# Patient Record
Sex: MALE | Race: WHITE | NOT HISPANIC OR LATINO | Employment: OTHER | URBAN - NONMETROPOLITAN AREA
[De-identification: names, ages, dates, MRNs, and addresses within clinical notes are randomized per-mention and may not be internally consistent; named-entity substitution may affect disease eponyms.]

---

## 2017-01-01 ENCOUNTER — PREP FOR SURGERY (OUTPATIENT)
Dept: OTHER | Facility: HOSPITAL | Age: 52
End: 2017-01-01

## 2017-01-01 ENCOUNTER — ANESTHESIA EVENT (OUTPATIENT)
Dept: PERIOP | Facility: HOSPITAL | Age: 52
End: 2017-01-01

## 2017-01-01 ENCOUNTER — HOSPITAL ENCOUNTER (OUTPATIENT)
Dept: GENERAL RADIOLOGY | Facility: HOSPITAL | Age: 52
Discharge: HOME | End: 2017-07-28
Attending: INTERNAL MEDICINE | Admitting: INTERNAL MEDICINE

## 2017-01-01 ENCOUNTER — OFFICE VISIT (OUTPATIENT)
Dept: INTERNAL MEDICINE | Facility: CLINIC | Age: 52
End: 2017-01-01

## 2017-01-01 ENCOUNTER — TELEPHONE (OUTPATIENT)
Dept: INTERNAL MEDICINE | Facility: CLINIC | Age: 52
End: 2017-01-01

## 2017-01-01 ENCOUNTER — OFFICE VISIT (OUTPATIENT)
Dept: ORTHOPEDIC SURGERY | Facility: CLINIC | Age: 52
End: 2017-01-01

## 2017-01-01 ENCOUNTER — OFFICE VISIT (OUTPATIENT)
Dept: GASTROENTEROLOGY | Facility: CLINIC | Age: 52
End: 2017-01-01

## 2017-01-01 ENCOUNTER — TELEPHONE (OUTPATIENT)
Dept: ORTHOPEDIC SURGERY | Facility: CLINIC | Age: 52
End: 2017-01-01

## 2017-01-01 ENCOUNTER — APPOINTMENT (OUTPATIENT)
Dept: CT IMAGING | Facility: HOSPITAL | Age: 52
End: 2017-01-01

## 2017-01-01 ENCOUNTER — ANESTHESIA (OUTPATIENT)
Dept: PERIOP | Facility: HOSPITAL | Age: 52
End: 2017-01-01

## 2017-01-01 ENCOUNTER — HOSPITAL ENCOUNTER (OUTPATIENT)
Facility: HOSPITAL | Age: 52
Setting detail: HOSPITAL OUTPATIENT SURGERY
Discharge: HOME | End: 2017-04-17
Attending: PODIATRIST | Admitting: PODIATRIST

## 2017-01-01 ENCOUNTER — HOSPITAL ENCOUNTER (EMERGENCY)
Facility: HOSPITAL | Age: 52
Discharge: HOME | End: 2017-08-08
Attending: EMERGENCY MEDICINE | Admitting: EMERGENCY MEDICINE

## 2017-01-01 VITALS — HEIGHT: 66 IN | RESPIRATION RATE: 16 BRPM | BODY MASS INDEX: 30.53 KG/M2 | WEIGHT: 190 LBS

## 2017-01-01 VITALS
HEART RATE: 67 BPM | OXYGEN SATURATION: 97 % | TEMPERATURE: 98.2 F | RESPIRATION RATE: 18 BRPM | DIASTOLIC BLOOD PRESSURE: 78 MMHG | SYSTOLIC BLOOD PRESSURE: 129 MMHG

## 2017-01-01 VITALS — RESPIRATION RATE: 18 BRPM | HEIGHT: 66 IN | BODY MASS INDEX: 29.89 KG/M2 | WEIGHT: 186 LBS

## 2017-01-01 VITALS
TEMPERATURE: 97.6 F | SYSTOLIC BLOOD PRESSURE: 130 MMHG | OXYGEN SATURATION: 98 % | DIASTOLIC BLOOD PRESSURE: 82 MMHG | BODY MASS INDEX: 29.63 KG/M2 | RESPIRATION RATE: 16 BRPM | WEIGHT: 184.38 LBS | HEIGHT: 66 IN | HEART RATE: 78 BPM

## 2017-01-01 VITALS
SYSTOLIC BLOOD PRESSURE: 128 MMHG | HEART RATE: 57 BPM | WEIGHT: 165 LBS | TEMPERATURE: 98.4 F | OXYGEN SATURATION: 98 % | BODY MASS INDEX: 26.52 KG/M2 | RESPIRATION RATE: 20 BRPM | DIASTOLIC BLOOD PRESSURE: 94 MMHG | HEIGHT: 66 IN

## 2017-01-01 VITALS
HEART RATE: 100 BPM | TEMPERATURE: 98.3 F | HEIGHT: 66 IN | SYSTOLIC BLOOD PRESSURE: 125 MMHG | RESPIRATION RATE: 15 BRPM | BODY MASS INDEX: 28.93 KG/M2 | DIASTOLIC BLOOD PRESSURE: 93 MMHG | WEIGHT: 180 LBS

## 2017-01-01 VITALS
TEMPERATURE: 97.8 F | SYSTOLIC BLOOD PRESSURE: 144 MMHG | OXYGEN SATURATION: 98 % | HEART RATE: 66 BPM | DIASTOLIC BLOOD PRESSURE: 86 MMHG

## 2017-01-01 VITALS — RESPIRATION RATE: 16 BRPM | HEIGHT: 66 IN | WEIGHT: 181.1 LBS | BODY MASS INDEX: 29.11 KG/M2

## 2017-01-01 DIAGNOSIS — R35.1 FREQUENT URINATION AT NIGHT: ICD-10-CM

## 2017-01-01 DIAGNOSIS — M79.89 SOFT TISSUE MASS: ICD-10-CM

## 2017-01-01 DIAGNOSIS — M51.36 DEGENERATIVE DISC DISEASE, LUMBAR: ICD-10-CM

## 2017-01-01 DIAGNOSIS — M54.50 CHRONIC LOW BACK PAIN WITHOUT SCIATICA, UNSPECIFIED BACK PAIN LATERALITY: ICD-10-CM

## 2017-01-01 DIAGNOSIS — M79.89 SOFT TISSUE MASS: Primary | ICD-10-CM

## 2017-01-01 DIAGNOSIS — Z83.438 FAMILY HISTORY OF HYPERLIPIDEMIA: ICD-10-CM

## 2017-01-01 DIAGNOSIS — M54.50 ACUTE EXACERBATION OF CHRONIC LOW BACK PAIN: Primary | ICD-10-CM

## 2017-01-01 DIAGNOSIS — Z12.11 COLON CANCER SCREENING: Primary | ICD-10-CM

## 2017-01-01 DIAGNOSIS — M25.551 ACUTE RIGHT HIP PAIN: Primary | ICD-10-CM

## 2017-01-01 DIAGNOSIS — Z00.00 ANNUAL PHYSICAL EXAM: Primary | ICD-10-CM

## 2017-01-01 DIAGNOSIS — Z12.11 SCREEN FOR COLON CANCER: ICD-10-CM

## 2017-01-01 DIAGNOSIS — M25.551 ACUTE RIGHT HIP PAIN: ICD-10-CM

## 2017-01-01 DIAGNOSIS — M75.121 COMPLETE TEAR OF RIGHT ROTATOR CUFF: ICD-10-CM

## 2017-01-01 DIAGNOSIS — M25.511 ACUTE PAIN OF RIGHT SHOULDER: Primary | ICD-10-CM

## 2017-01-01 DIAGNOSIS — IMO0002 BURSITIS/TENDONITIS, SHOULDER: Primary | ICD-10-CM

## 2017-01-01 DIAGNOSIS — M54.50 LUMBAR SPINE PAIN: Primary | ICD-10-CM

## 2017-01-01 DIAGNOSIS — G89.29 CHRONIC LOW BACK PAIN WITHOUT SCIATICA, UNSPECIFIED BACK PAIN LATERALITY: ICD-10-CM

## 2017-01-01 DIAGNOSIS — G89.29 ACUTE EXACERBATION OF CHRONIC LOW BACK PAIN: Primary | ICD-10-CM

## 2017-01-01 LAB
AMPHET+METHAMPHET UR QL: NEGATIVE
AMPHETAMINES UR QL: NEGATIVE
BARBITURATES UR QL SCN: NEGATIVE
BENZODIAZ UR QL SCN: NEGATIVE
BILIRUB UR QL STRIP: NEGATIVE
BUPRENORPHINE SERPL-MCNC: NEGATIVE NG/ML
CANNABINOIDS SERPL QL: NEGATIVE
CHOLEST SERPL-MCNC: 169 MG/DL (ref 0–199)
CLARITY UR: CLEAR
COCAINE UR QL: NEGATIVE
COLOR UR: YELLOW
GLUCOSE UR STRIP-MCNC: NEGATIVE MG/DL
HBA1C MFR BLD: 5.3 %
HDLC SERPL-MCNC: 68 MG/DL (ref 40–60)
HGB UR QL STRIP.AUTO: NEGATIVE
KETONES UR QL STRIP: NEGATIVE
LAB AP CASE REPORT: NORMAL
LDLC SERPL CALC-MCNC: 86 MG/DL (ref 0–99)
LEUKOCYTE ESTERASE UR QL STRIP.AUTO: NEGATIVE
Lab: NORMAL
METHADONE UR QL SCN: NEGATIVE
NITRITE UR QL STRIP: NEGATIVE
OPIATES UR QL: POSITIVE
OXYCODONE UR QL SCN: NEGATIVE
PATH REPORT.FINAL DX SPEC: NORMAL
PCP UR QL SCN: NEGATIVE
PH UR STRIP.AUTO: <=5 [PH] (ref 5–8)
PROPOXYPH UR QL: NEGATIVE
PROT UR QL STRIP: NEGATIVE
PSA SERPL-MCNC: 0.93 NG/ML (ref 0.06–4)
SP GR UR STRIP: 1.02 (ref 1–1.03)
TRICYCLICS UR QL SCN: NEGATIVE
TRIGL SERPL-MCNC: 74 MG/DL
UROBILINOGEN UR QL STRIP: NORMAL
VLDLC SERPL CALC-MCNC: 14.8 MG/DL

## 2017-01-01 PROCEDURE — 96372 THER/PROPH/DIAG INJ SC/IM: CPT

## 2017-01-01 PROCEDURE — 81003 URINALYSIS AUTO W/O SCOPE: CPT | Performed by: PHYSICIAN ASSISTANT

## 2017-01-01 PROCEDURE — 73502 X-RAY EXAM HIP UNI 2-3 VIEWS: CPT

## 2017-01-01 PROCEDURE — 25010000002 PROPOFOL 200 MG/20ML EMULSION: Performed by: NURSE ANESTHETIST, CERTIFIED REGISTERED

## 2017-01-01 PROCEDURE — 25010000002 ORPHENADRINE CITRATE PER 60 MG: Performed by: PHYSICIAN ASSISTANT

## 2017-01-01 PROCEDURE — 99203 OFFICE O/P NEW LOW 30 MIN: CPT | Performed by: ORTHOPAEDIC SURGERY

## 2017-01-01 PROCEDURE — 99214 OFFICE O/P EST MOD 30 MIN: CPT | Performed by: INTERNAL MEDICINE

## 2017-01-01 PROCEDURE — 99024 POSTOP FOLLOW-UP VISIT: CPT | Performed by: PODIATRIST

## 2017-01-01 PROCEDURE — 72100 X-RAY EXAM L-S SPINE 2/3 VWS: CPT

## 2017-01-01 PROCEDURE — 25010000002 KETOROLAC TROMETHAMINE PER 15 MG: Performed by: PHYSICIAN ASSISTANT

## 2017-01-01 PROCEDURE — 83036 HEMOGLOBIN GLYCOSYLATED A1C: CPT | Performed by: NURSE PRACTITIONER

## 2017-01-01 PROCEDURE — S0260 H&P FOR SURGERY: HCPCS | Performed by: NURSE PRACTITIONER

## 2017-01-01 PROCEDURE — 99283 EMERGENCY DEPT VISIT LOW MDM: CPT

## 2017-01-01 PROCEDURE — 99396 PREV VISIT EST AGE 40-64: CPT | Performed by: NURSE PRACTITIONER

## 2017-01-01 PROCEDURE — 27618 EXC LEG/ANKLE TUM < 3 CM: CPT | Performed by: PODIATRIST

## 2017-01-01 PROCEDURE — 80306 DRUG TEST PRSMV INSTRMNT: CPT | Performed by: PHYSICIAN ASSISTANT

## 2017-01-01 PROCEDURE — 72131 CT LUMBAR SPINE W/O DYE: CPT

## 2017-01-01 PROCEDURE — 25010000002 DEXAMETHASONE PER 1 MG: Performed by: NURSE ANESTHETIST, CERTIFIED REGISTERED

## 2017-01-01 PROCEDURE — 25010000003 CEFAZOLIN PER 500 MG: Performed by: PODIATRIST

## 2017-01-01 PROCEDURE — 25010000002 ONDANSETRON PER 1 MG: Performed by: NURSE ANESTHETIST, CERTIFIED REGISTERED

## 2017-01-01 RX ORDER — BACITRACIN 50000 [IU]/1
INJECTION, POWDER, FOR SOLUTION INTRAMUSCULAR AS NEEDED
Status: DISCONTINUED | OUTPATIENT
Start: 2017-01-01 | End: 2017-01-01 | Stop reason: HOSPADM

## 2017-01-01 RX ORDER — PREDNISONE 20 MG/1
20 TABLET ORAL DAILY
Qty: 18 TABLET | Refills: 0 | Status: SHIPPED | OUTPATIENT
Start: 2017-01-01 | End: 2017-01-01 | Stop reason: SDUPTHER

## 2017-01-01 RX ORDER — BACITRACIN 50000 [IU]/1
INJECTION, POWDER, FOR SOLUTION INTRAMUSCULAR
Status: DISCONTINUED
Start: 2017-01-01 | End: 2017-01-01 | Stop reason: HOSPADM

## 2017-01-01 RX ORDER — PROPOFOL 10 MG/ML
INJECTION, EMULSION INTRAVENOUS AS NEEDED
Status: DISCONTINUED | OUTPATIENT
Start: 2017-01-01 | End: 2017-01-01 | Stop reason: SURG

## 2017-01-01 RX ORDER — OXYCODONE AND ACETAMINOPHEN 7.5; 325 MG/1; MG/1
1 TABLET ORAL EVERY 6 HOURS PRN
Qty: 30 TABLET | Refills: 0 | Status: SHIPPED | OUTPATIENT
Start: 2017-01-01 | End: 2017-01-01

## 2017-01-01 RX ORDER — AMITRIPTYLINE HYDROCHLORIDE 25 MG/1
TABLET, FILM COATED ORAL
Qty: 60 TABLET | Refills: 1 | Status: SHIPPED | OUTPATIENT
Start: 2017-01-01 | End: 2017-01-01 | Stop reason: ALTCHOICE

## 2017-01-01 RX ORDER — SODIUM CHLORIDE 0.9 % (FLUSH) 0.9 %
1-10 SYRINGE (ML) INJECTION AS NEEDED
Status: DISCONTINUED | OUTPATIENT
Start: 2017-01-01 | End: 2017-01-01 | Stop reason: HOSPADM

## 2017-01-01 RX ORDER — LIDOCAINE HYDROCHLORIDE 10 MG/ML
INJECTION, SOLUTION INFILTRATION; PERINEURAL AS NEEDED
Status: DISCONTINUED | OUTPATIENT
Start: 2017-01-01 | End: 2017-01-01 | Stop reason: HOSPADM

## 2017-01-01 RX ORDER — CELECOXIB 200 MG/1
200 CAPSULE ORAL 2 TIMES DAILY
Qty: 60 CAPSULE | Refills: 5 | Status: SHIPPED | OUTPATIENT
Start: 2017-01-01

## 2017-01-01 RX ORDER — PREDNISONE 20 MG/1
TABLET ORAL
Qty: 18 TABLET | Refills: 0 | Status: SHIPPED | OUTPATIENT
Start: 2017-01-01 | End: 2017-01-01

## 2017-01-01 RX ORDER — ONDANSETRON 2 MG/ML
INJECTION INTRAMUSCULAR; INTRAVENOUS AS NEEDED
Status: DISCONTINUED | OUTPATIENT
Start: 2017-01-01 | End: 2017-01-01 | Stop reason: SURG

## 2017-01-01 RX ORDER — LIDOCAINE HYDROCHLORIDE 10 MG/ML
INJECTION, SOLUTION INFILTRATION; PERINEURAL
Status: DISCONTINUED
Start: 2017-01-01 | End: 2017-01-01 | Stop reason: HOSPADM

## 2017-01-01 RX ORDER — HYDROCODONE BITARTRATE AND ACETAMINOPHEN 7.5; 325 MG/1; MG/1
TABLET ORAL
Refills: 0 | COMMUNITY
Start: 2017-01-01 | End: 2017-01-01

## 2017-01-01 RX ORDER — OXYCODONE AND ACETAMINOPHEN 10; 325 MG/1; MG/1
TABLET ORAL
Refills: 0 | COMMUNITY
Start: 2017-01-01 | End: 2017-01-01

## 2017-01-01 RX ORDER — CELECOXIB 200 MG/1
CAPSULE ORAL
Qty: 60 CAPSULE | Refills: 1 | Status: SHIPPED | OUTPATIENT
Start: 2017-01-01 | End: 2017-01-01 | Stop reason: SDUPTHER

## 2017-01-01 RX ORDER — SODIUM CHLORIDE, SODIUM LACTATE, POTASSIUM CHLORIDE, CALCIUM CHLORIDE 600; 310; 30; 20 MG/100ML; MG/100ML; MG/100ML; MG/100ML
100 INJECTION, SOLUTION INTRAVENOUS CONTINUOUS
Status: DISCONTINUED | OUTPATIENT
Start: 2017-01-01 | End: 2017-01-01 | Stop reason: HOSPADM

## 2017-01-01 RX ORDER — CYCLOBENZAPRINE HCL 5 MG
5 TABLET ORAL NIGHTLY PRN
Qty: 7 TABLET | Refills: 0 | Status: SHIPPED | OUTPATIENT
Start: 2017-01-01 | End: 2017-01-01

## 2017-01-01 RX ORDER — DEXAMETHASONE SODIUM PHOSPHATE 4 MG/ML
INJECTION, SOLUTION INTRA-ARTICULAR; INTRALESIONAL; INTRAMUSCULAR; INTRAVENOUS; SOFT TISSUE AS NEEDED
Status: DISCONTINUED | OUTPATIENT
Start: 2017-01-01 | End: 2017-01-01 | Stop reason: SURG

## 2017-01-01 RX ORDER — ORPHENADRINE CITRATE 30 MG/ML
30 INJECTION INTRAMUSCULAR; INTRAVENOUS ONCE
Status: COMPLETED | OUTPATIENT
Start: 2017-01-01 | End: 2017-01-01

## 2017-01-01 RX ORDER — CELECOXIB 200 MG/1
200 CAPSULE ORAL 2 TIMES DAILY
Qty: 60 CAPSULE | Refills: 5 | Status: SHIPPED | OUTPATIENT
Start: 2017-01-01 | End: 2017-01-01 | Stop reason: SDUPTHER

## 2017-01-01 RX ORDER — OXYCODONE AND ACETAMINOPHEN 10; 325 MG/1; MG/1
1 TABLET ORAL EVERY 6 HOURS PRN
COMMUNITY
End: 2017-01-01

## 2017-01-01 RX ORDER — SODIUM CHLORIDE 9 MG/ML
70 INJECTION, SOLUTION INTRAVENOUS CONTINUOUS PRN
Status: CANCELLED | OUTPATIENT
Start: 2017-01-01

## 2017-01-01 RX ORDER — BUPIVACAINE HYDROCHLORIDE 5 MG/ML
INJECTION, SOLUTION EPIDURAL; INTRACAUDAL AS NEEDED
Status: DISCONTINUED | OUTPATIENT
Start: 2017-01-01 | End: 2017-01-01 | Stop reason: HOSPADM

## 2017-01-01 RX ORDER — BUPIVACAINE HYDROCHLORIDE 5 MG/ML
INJECTION, SOLUTION EPIDURAL; INTRACAUDAL
Status: DISCONTINUED
Start: 2017-01-01 | End: 2017-01-01 | Stop reason: HOSPADM

## 2017-01-01 RX ORDER — KETOROLAC TROMETHAMINE 30 MG/ML
30 INJECTION, SOLUTION INTRAMUSCULAR; INTRAVENOUS ONCE
Status: COMPLETED | OUTPATIENT
Start: 2017-01-01 | End: 2017-01-01

## 2017-01-01 RX ORDER — GABAPENTIN 300 MG/1
CAPSULE ORAL
COMMUNITY
Start: 2017-01-01 | End: 2017-01-01

## 2017-01-01 RX ADMIN — KETOROLAC TROMETHAMINE 30 MG: 30 INJECTION, SOLUTION INTRAMUSCULAR at 11:11

## 2017-01-01 RX ADMIN — ORPHENADRINE CITRATE 30 MG: 30 INJECTION INTRAMUSCULAR; INTRAVENOUS at 11:12

## 2017-01-01 RX ADMIN — CEFAZOLIN SODIUM 2 G: 2 SOLUTION INTRAVENOUS at 07:55

## 2017-01-01 RX ADMIN — Medication 20 MG: at 08:01

## 2017-01-01 RX ADMIN — DEXAMETHASONE SODIUM PHOSPHATE 8 MG: 4 INJECTION, SOLUTION INTRAMUSCULAR; INTRAVENOUS at 07:56

## 2017-01-01 RX ADMIN — Medication 30 MG: at 07:54

## 2017-01-01 RX ADMIN — PROPOFOL 200 MG: 10 INJECTION, EMULSION INTRAVENOUS at 07:54

## 2017-01-01 RX ADMIN — LIDOCAINE HYDROCHLORIDE 40 MG: 20 INJECTION, SOLUTION INTRAVENOUS at 07:54

## 2017-01-01 RX ADMIN — ONDANSETRON 4 MG: 2 INJECTION INTRAMUSCULAR; INTRAVENOUS at 07:56

## 2017-01-01 RX ADMIN — SODIUM CHLORIDE, POTASSIUM CHLORIDE, SODIUM LACTATE AND CALCIUM CHLORIDE: 600; 310; 30; 20 INJECTION, SOLUTION INTRAVENOUS at 07:54

## 2017-01-01 RX ADMIN — SODIUM CHLORIDE, POTASSIUM CHLORIDE, SODIUM LACTATE AND CALCIUM CHLORIDE 100 ML/HR: 600; 310; 30; 20 INJECTION, SOLUTION INTRAVENOUS at 07:42

## 2017-02-13 RX ORDER — CELECOXIB 200 MG/1
CAPSULE ORAL
Qty: 60 CAPSULE | Refills: 1 | Status: SHIPPED | OUTPATIENT
Start: 2017-02-13 | End: 2017-01-01 | Stop reason: SDUPTHER

## 2017-02-23 ENCOUNTER — TELEPHONE (OUTPATIENT)
Dept: INTERNAL MEDICINE | Facility: CLINIC | Age: 52
End: 2017-02-23

## 2017-02-23 NOTE — TELEPHONE ENCOUNTER
----- Message from Maryam Copeland sent at 2/23/2017  3:33 PM EST -----  PATIENT CALLED AND WANTED DR VERMEESCH TO KNOW THAT THE INJECTIONS HE HAS BEEN GETTING ARE NOT WORKING. HE STATES THAT THE MEDICATION CELEBREX IS NOT HAVING AN AFFECT ON HIM AS WELL HE STATES THAT HE IS HAVING SHOOTING PAIN IN HIS LOWER BACK. PLEASE CALL AND LET HIM KNOW IF THERE IS SOMETHING ELSE DR VERMEESCH WANTS HIM TO DO.     [phone]

## 2017-02-24 ENCOUNTER — OFFICE VISIT (OUTPATIENT)
Dept: INTERNAL MEDICINE | Facility: CLINIC | Age: 52
End: 2017-02-24

## 2017-02-24 VITALS
HEART RATE: 61 BPM | DIASTOLIC BLOOD PRESSURE: 68 MMHG | OXYGEN SATURATION: 98 % | SYSTOLIC BLOOD PRESSURE: 132 MMHG | TEMPERATURE: 97.9 F

## 2017-02-24 DIAGNOSIS — J00 ACUTE NASOPHARYNGITIS: Primary | ICD-10-CM

## 2017-02-24 DIAGNOSIS — M67.80 CYST OF TENDON SHEATH: ICD-10-CM

## 2017-02-24 PROCEDURE — 99214 OFFICE O/P EST MOD 30 MIN: CPT | Performed by: INTERNAL MEDICINE

## 2017-03-24 DIAGNOSIS — M25.572 LEFT ANKLE PAIN, UNSPECIFIED CHRONICITY: Primary | ICD-10-CM

## 2017-03-28 ENCOUNTER — OFFICE VISIT (OUTPATIENT)
Dept: ORTHOPEDIC SURGERY | Facility: CLINIC | Age: 52
End: 2017-03-28

## 2017-03-28 VITALS — RESPIRATION RATE: 18 BRPM | WEIGHT: 191 LBS | HEIGHT: 66 IN | BODY MASS INDEX: 30.7 KG/M2

## 2017-03-28 DIAGNOSIS — M79.89 SOFT TISSUE MASS: Primary | ICD-10-CM

## 2017-03-28 DIAGNOSIS — M25.571 RIGHT ANKLE PAIN, UNSPECIFIED CHRONICITY: Primary | ICD-10-CM

## 2017-03-28 PROCEDURE — 99204 OFFICE O/P NEW MOD 45 MIN: CPT | Performed by: PODIATRIST

## 2017-03-28 RX ORDER — SODIUM CHLORIDE 0.9 % (FLUSH) 0.9 %
1-10 SYRINGE (ML) INJECTION AS NEEDED
Status: CANCELLED | OUTPATIENT
Start: 2017-03-28

## 2017-03-28 RX ORDER — SODIUM CHLORIDE, SODIUM LACTATE, POTASSIUM CHLORIDE, CALCIUM CHLORIDE 600; 310; 30; 20 MG/100ML; MG/100ML; MG/100ML; MG/100ML
100 INJECTION, SOLUTION INTRAVENOUS CONTINUOUS
Status: CANCELLED | OUTPATIENT
Start: 2017-03-28

## 2017-03-28 NOTE — PROGRESS NOTES
Subjective   Patient ID: Daryl Jerome is a 51 y.o. male   Pain, Consult, and Cyst of the Right Ankle (Patient is being seen today at the request of Dr. Vermeesch.)   he presents with the chief complaint of right ankle pain due to a mass.  He states he doesn't know what it is.  He says it started out small and has progressively gotten larger over time.  States irritates him with any type of high top shoe or boots.  Dates it back to have this removed if possible.    History of Present Illness    Review of Systems   Constitutional: Negative for diaphoresis, fever and unexpected weight change.   HENT: Negative for dental problem and sore throat.    Eyes: Negative for visual disturbance.   Respiratory: Negative for shortness of breath.    Cardiovascular: Negative for chest pain.   Gastrointestinal: Negative for abdominal pain, constipation, diarrhea, nausea and vomiting.   Genitourinary: Negative for difficulty urinating and frequency.   Neurological: Negative for headaches.   Hematological: Does not bruise/bleed easily.       Past Medical History:   Diagnosis Date   • History of MRI     MRI of back and was told it was arthritis in back        Past Surgical History:   Procedure Laterality Date   • APPENDECTOMY         Allergies   Allergen Reactions   • No Known Drug Allergy        @/ reviewed@    Objective   Physical Exam   Constitutional: He is oriented to person, place, and time. He appears well-developed and well-nourished.   HENT:   Head: Normocephalic.   Eyes: Pupils are equal, round, and reactive to light.   Neurological: He is alert and oriented to person, place, and time.   Skin: Skin is warm.   Psychiatric: He has a normal mood and affect. His behavior is normal.   Vitals reviewed.   heart regular rate and rhythm  Lungs clear to auscultation  Abdomen soft nontender  Ortho Exam  Ortho Exam  Right Lower extremity exam:  Vascular: Pulses are palpable, pedal hair is noted, no edema or erythema are noted the  right lower extremity  Neuro: Sensations intact, reflexes normal  Derm: Over the distal anterior portion of the right fibula there is a elevated raised and firm nodule.  This area appears to be some adhered to the underlying subcutaneous tissue but is mobile.  Also seems to slightly transilluminate light.  MSK: I'll joint range of motion is normal.  Manual muscle testing 5 out of 5.  No pain with range of motion of the lower extremity joints    Assessment/Plan  right ankle soft tissue mass  Independent Review of Radiographic Studies:      Laboratory and Other Studies:     Medical Decision Making:        Procedures  [] No procedures were performed in office today.    Daryl was seen today for pain, consult and cyst.    Diagnoses and all orders for this visit:    Soft tissue mass        Recommendations/Plan:  I discussed the possible etiologies of this lesion.  I explained that it most likely is a ganglion although this is an odd spot for it clinically that's what it appears to be.  We discussed excision as well as aspiration and injection.  I advised him that typically draining these they will still come back.  I explained that even with excision they can return to this less likely.  He like to have this removed.  We discussed all the risks versus benefits and potential complications of the procedure.  No guarantees or assurances are given or implied.  Consent was obtained.  All his questions are answered.    No Follow-up on file.  Patient agreeable to call or return sooner for any concerns.

## 2017-04-02 ENCOUNTER — RESULTS ENCOUNTER (OUTPATIENT)
Dept: ORTHOPEDIC SURGERY | Facility: CLINIC | Age: 52
End: 2017-04-02

## 2017-04-02 DIAGNOSIS — M79.89 SOFT TISSUE MASS: ICD-10-CM

## 2017-04-03 ENCOUNTER — TELEPHONE (OUTPATIENT)
Dept: INTERNAL MEDICINE | Facility: CLINIC | Age: 52
End: 2017-04-03

## 2017-04-03 RX ORDER — FLUTICASONE PROPIONATE 50 MCG
2 SPRAY, SUSPENSION (ML) NASAL DAILY
Qty: 1 EACH | Refills: 3 | Status: SHIPPED | OUTPATIENT
Start: 2017-04-03

## 2017-04-03 NOTE — TELEPHONE ENCOUNTER
This med is over-the-counter, however you may try to call this in to see if insurance pays for it, Flonase 2 sprays each near daily #1 with 3 refills.

## 2017-04-10 ENCOUNTER — APPOINTMENT (OUTPATIENT)
Dept: PREADMISSION TESTING | Facility: HOSPITAL | Age: 52
End: 2017-04-10

## 2017-04-11 ENCOUNTER — APPOINTMENT (OUTPATIENT)
Dept: PREADMISSION TESTING | Facility: HOSPITAL | Age: 52
End: 2017-04-11

## 2017-04-11 VITALS — WEIGHT: 190 LBS | BODY MASS INDEX: 30.53 KG/M2 | HEIGHT: 66 IN

## 2017-04-11 LAB
ALBUMIN SERPL-MCNC: 4.9 G/DL (ref 3.5–5)
ALBUMIN/GLOB SERPL: 1.4 G/DL (ref 1–2)
ALP SERPL-CCNC: 56 U/L (ref 38–126)
ALT SERPL W P-5'-P-CCNC: 31 U/L (ref 13–69)
ANION GAP SERPL CALCULATED.3IONS-SCNC: 12.3 MMOL/L
AST SERPL-CCNC: 24 U/L (ref 15–46)
BASOPHILS # BLD AUTO: 0.03 10*3/MM3 (ref 0–0.2)
BASOPHILS NFR BLD AUTO: 0.5 % (ref 0–2.5)
BILIRUB SERPL-MCNC: 1.1 MG/DL (ref 0.2–1.3)
BUN BLD-MCNC: 32 MG/DL (ref 7–20)
BUN/CREAT SERPL: 32 (ref 6.3–21.9)
CALCIUM SPEC-SCNC: 10.1 MG/DL (ref 8.4–10.2)
CHLORIDE SERPL-SCNC: 105 MMOL/L (ref 98–107)
CO2 SERPL-SCNC: 27 MMOL/L (ref 26–30)
CREAT BLD-MCNC: 1 MG/DL (ref 0.6–1.3)
DEPRECATED RDW RBC AUTO: 41.2 FL (ref 37–54)
EOSINOPHIL # BLD AUTO: 0.15 10*3/MM3 (ref 0–0.7)
EOSINOPHIL NFR BLD AUTO: 2.5 % (ref 0–7)
ERYTHROCYTE [DISTWIDTH] IN BLOOD BY AUTOMATED COUNT: 11.9 % (ref 11.5–14.5)
GFR SERPL CREATININE-BSD FRML MDRD: 79 ML/MIN/1.73
GLOBULIN UR ELPH-MCNC: 3.4 GM/DL
GLUCOSE BLD-MCNC: 99 MG/DL (ref 74–98)
HCT VFR BLD AUTO: 45.5 % (ref 42–52)
HGB BLD-MCNC: 15.5 G/DL (ref 14–18)
IMM GRANULOCYTES # BLD: 0.01 10*3/MM3 (ref 0–0.06)
IMM GRANULOCYTES NFR BLD: 0.2 % (ref 0–0.6)
LYMPHOCYTES # BLD AUTO: 1.37 10*3/MM3 (ref 0.6–3.4)
LYMPHOCYTES NFR BLD AUTO: 22.5 % (ref 10–50)
MCH RBC QN AUTO: 32.6 PG (ref 27–31)
MCHC RBC AUTO-ENTMCNC: 34.1 G/DL (ref 30–37)
MCV RBC AUTO: 95.8 FL (ref 80–94)
MONOCYTES # BLD AUTO: 0.69 10*3/MM3 (ref 0–0.9)
MONOCYTES NFR BLD AUTO: 11.3 % (ref 0–12)
NEUTROPHILS # BLD AUTO: 3.84 10*3/MM3 (ref 2–6.9)
NEUTROPHILS NFR BLD AUTO: 63 % (ref 37–80)
NRBC BLD MANUAL-RTO: 0 /100 WBC (ref 0–0)
PLATELET # BLD AUTO: 275 10*3/MM3 (ref 130–400)
PMV BLD AUTO: 9.6 FL (ref 6–12)
POTASSIUM BLD-SCNC: 4.3 MMOL/L (ref 3.5–5.1)
PROT SERPL-MCNC: 8.3 G/DL (ref 6.3–8.2)
RBC # BLD AUTO: 4.75 10*6/MM3 (ref 4.7–6.1)
SODIUM BLD-SCNC: 140 MMOL/L (ref 137–145)
WBC NRBC COR # BLD: 6.09 10*3/MM3 (ref 4.8–10.8)

## 2017-04-11 PROCEDURE — 36415 COLL VENOUS BLD VENIPUNCTURE: CPT | Performed by: PODIATRIST

## 2017-04-11 PROCEDURE — 80053 COMPREHEN METABOLIC PANEL: CPT | Performed by: PODIATRIST

## 2017-04-11 PROCEDURE — 85025 COMPLETE CBC W/AUTO DIFF WBC: CPT | Performed by: PODIATRIST

## 2017-04-11 RX ORDER — IBUPROFEN 200 MG
200 TABLET ORAL EVERY 6 HOURS PRN
COMMUNITY

## 2017-04-11 NOTE — DISCHARGE INSTRUCTIONS
Patient instructed on PAT PASS information.  Patient/family member verbalized understanding of instruction/education.Chlorhexidine wipes given to patient with verification sheet. Patient/Family member verbalized understanding to all teaching and instruction.

## 2017-04-12 NOTE — PAT
Patient given wipes and instructions on how to use . Patient stated wouldn't alcohol work better , I think that 's  What I'll use.

## 2017-04-17 NOTE — PLAN OF CARE
Problem: Perioperative Period (Adult)  Intervention: Promote Pulmonary Hygiene and Secretion Clearance    04/17/17 0840   Promote Aggressive Pulmonary Hygiene/Secretion Management   Cough And Deep Breathing done with encouragement       Intervention: Monitor/Manage Pain    04/17/17 0840   Safety Interventions   Medication Review/Management medications reviewed

## 2017-04-17 NOTE — PLAN OF CARE
Problem: Patient Care Overview (Adult)  Goal: Plan of Care Review  Outcome: Ongoing (interventions implemented as appropriate)    04/17/17 0910   Coping/Psychosocial Response Interventions   Plan Of Care Reviewed With patient   Patient Care Overview   Progress improving   Outcome Evaluation   Outcome Summary/Follow up Plan PACU discharge criteria met

## 2017-04-17 NOTE — ANESTHESIA PREPROCEDURE EVALUATION
Anesthesia Evaluation     Patient summary reviewed and Nursing notes reviewed   NPO Status: > 8 hours   Airway   Mallampati: II  TM distance: <3 FB  Neck ROM: full  possible difficult intubation  Dental      Pulmonary - normal exam   (+) recent URI,   Cardiovascular - normal exam        Neuro/Psych  GI/Hepatic/Renal/Endo      Musculoskeletal     Abdominal  - normal exam   Substance History      OB/GYN          Other   (+) arthritis                               Anesthesia Plan    ASA 2     general     intravenous induction   Anesthetic plan and risks discussed with patient.

## 2017-04-17 NOTE — BRIEF OP NOTE
EXCISION MASS LOWER EXTREMITY  Procedure Note    Darly ARNDT Justus  4/17/2017    Pre-op Diagnosis:   Soft tissue mass [M79.9]    Post-op Diagnosis:     Post-Op Diagnosis Codes:     * Soft tissue mass [M79.9]    Procedure/CPT® Codes:  right ankle soft tissue mass excision cpt code 53611    Procedure(s):  Right ankle soft tissue mass excision    Surgeon(s):  Nirav Aguayo DPM    Anesthesia: Monitor Anesthesia Care    Staff:   Circulator: Marion England RN  Scrub Person: Francisca Parker; Haleigh White    Estimated Blood Loss: <10ml  Urine Voided: * No values recorded between 4/17/2017 12:00 AM and 4/17/2017  7:26 AM *    Specimens:                Per dictation      Drains:    none       Findings: per dictation    Complications: none      Nirav Aguayo DPM     Date: 4/17/2017  Time: 7:26 AM

## 2017-04-17 NOTE — PLAN OF CARE
Problem: GI Endoscopy (Adult)  Goal: Signs and Symptoms of Listed Potential Problems Will be Absent or Manageable (GI Endoscopy)  Outcome: Ongoing (interventions implemented as appropriate)    04/17/17 0722   GI Endoscopy   Problems Assessed (GI Endoscopy) all   Problems Present (GI Endoscopy) none

## 2017-04-17 NOTE — DISCHARGE INSTRUCTIONS
Rest today  No pushing,pulling,tugging,heavy lifting, or strenuous activity   No major decision making,driving,or drinking alcoholic beverages for 24 hours due to the sedation you received  Always use good hand hygiene/washing technique  No driving on pain medication.    To assist you in voiding:  Drink plenty of fluids  Listen to running water while attempting to void.    If you are unable to urinate and you have an uncomfortable urge to void or it has been   6 hours since you were discharged, return to the Emergency Room

## 2017-04-17 NOTE — PLAN OF CARE
Problem: Perioperative Period (Adult)  Goal: Signs and Symptoms of Listed Potential Problems Will be Absent or Manageable (Perioperative Period)  Outcome: Ongoing (interventions implemented as appropriate)    04/17/17 0850   Perioperative Period   Problems Assessed (Perioperative Period) all   Problems Present (Perioperative Period) none

## 2017-04-17 NOTE — ANESTHESIA POSTPROCEDURE EVALUATION
Patient: Daryl Jerome    Procedure Summary     Date Anesthesia Start Anesthesia Stop Room / Location    04/17/17 0747 0827  [facility] /  [facility]       Procedure Diagnosis Surgeon Provider    Right ankle soft tissue mass excision (Right ) Soft tissue mass  (Soft tissue mass [M79.9]) BRODY Gilbert CRNA          Anesthesia Type: general  Last vitals  /89 (04/17/17 0730)    Temp 97.8 °F (36.6 °C) (04/17/17 0730)    Pulse 65 (04/17/17 0730)   Resp 18 (04/17/17 0730)    SpO2 99 % (04/17/17 0730)      Post Anesthesia Care and Evaluation    Patient location during evaluation: PACU  Patient participation: complete - patient participated  Level of consciousness: awake  Pain score: 0  Pain management: satisfactory to patient  Airway patency: patent  Anesthetic complications: No anesthetic complications  PONV Status: none  Cardiovascular status: acceptable and hemodynamically stable  Respiratory status: acceptable  Hydration status: acceptable

## 2017-04-17 NOTE — NURSING NOTE
0900:  Pt dbp noted to be elevated in 90's, pt has no hx of htn, bp this am was 129/89.  Pt denies pain.  RONALD Collins CRNA consulted, no orders rec'd.  Continue to monitor, may send to [facility].  Notify anesthesia if dbp continues to go up.

## 2017-04-17 NOTE — ANESTHESIA PROCEDURE NOTES
Airway  Urgency: elective    Airway not difficult    General Information and Staff    Patient location during procedure: OR  CRNA: MANJEET WATT    Indications and Patient Condition  Indications for airway management: airway protection    Preoxygenated: yes  Mask difficulty assessment: 1 - vent by mask    Final Airway Details  Final airway type: supraglottic airway      Successful airway: classic  Size 4    Number of attempts at approach: 1    Additional Comments  Easy atraumatic lma placement

## 2017-04-17 NOTE — OP NOTE
DATE OF PROCEDURE:  04/17/2017    PREOPERATIVE DIAGNOSIS:  Right ankle soft tissue mass.    POSTOPERATIVE DIAGNOSIS:  Right ankle soft tissue mass.    PROCEDURE PERFORMED:  Right ankle soft tissue mass excision, CPT code 82418.    SURGEON:  Nirav Aguayo DPM    ANESTHESIA:  General with LMA and preoperative local consisting of 20 mL of 1:1 mixture 0.5% Marcaine and 1% lidocaine plain.      HEMOSTASIS:  Pneumatic right calf tourniquet at 250 mmHg for 12 minutes.     ESTIMATED BLOOD LOSS:  Minimal.     SPECIMENS:  A round firm soft tissue lesion measuring roughly 2.5 cm in diameter was excised and sent to pathology.      MATERIALS USED:  3-0 Monocryl and 3-0 nylon suture.      COMPLICATIONS:  None.      INDICATIONS FOR PROCEDURE:  This is a 51-year-old male who was presented to my office with chief complaint of right ankle pain secondary to a firm feeling, elevated soft tissue mass.  He states it was bothersome with shoes and if he hit it on anything it hurt.  He was requesting excision if possible.      PHYSICAL EXAMINATION:  He was noted to be neurovascularly intact to the right lower extremity.  On the area just adjacent to the lateral malleolus, there was a mobile firm feeling soft tissue mass that seemed to be adhered to the underlying subcutaneous tissue.  This lesion did not transilluminate light.  It was tender to palpation.  It showed no skin changes, no hyperpigmentation or erythema, or edema overlying the lesion.      We discussed surgical excision at his request, discussed all the risks versus benefits of potential complications of the procedure including recurrence.  All his questions were answered.  Consent was obtained.      He was seen preoperatively and the correct surgical site was marked.  He was given antibiotics preoperatively per protocol.    DESCRIPTION OF PROCEDURE:  The patient was brought to the operating room, placed on the operative table in a supine position.  General anesthesia with LMA  was administered.  Calf tourniquet was placed about the right calf.  The right ankle was anesthetized with 20 mL of a 1:1 mixture, 0.5% Marcaine and 1% lidocaine plain.  The right lower extremity was then scrubbed, prepped and draped in the usual aseptic manner.  Timeout was performed identifying the correct surgical patient, procedure and side.     The right lower extremity was elevated, exsanguinated and then pneumatic calf tourniquet was inflated at 250 mmHg.  Attention was then directed to the lateral aspect of the right ankle where a 3 cm linear incision was made directly over the lesion.  Once incising through the subcutaneous tissue, the lesion could be identified.  It was palpated and noted to be firm and well adhered to the underlying subcutaneous fat layer.  It was carefully dissected utilizing sharp scissors from its adjacent soft tissue attachments.  The entire lesion was removed in total.  It measured roughly 2.5 cm in diameter.  It was sent to pathology for analysis.      The wound was then irrigated with saline and wound was inspected and no additional abnormal appearing tissue was noted in the underlying tissue layers.  Wound was closed in layered fashion with 3-0 Monocryl and 3-0 nylon suture.      Sterile compressive dressing consisting of Adaptic, 4 x 4s, Kerlix and Ace wrap were applied to the right lower extremity.  The pneumatic calf tourniquet was deflated and a prompt hyperemic response noted all digits of the right foot.     The patient tolerated the procedure and anesthesia well.  He was transferred from the operating room to the recovery room with vital signs stable.  Neurovascular status intact to the right foot.          Nirav Aguayo DPM  D: ALMA 04/17/2017 08:34:18  T: marcos 04/17/2017 09:00:39  Job ID: [id number]  Document ID: [id number]

## 2017-04-27 NOTE — PROGRESS NOTES
Subjective   Patient ID: Daryl Jerome is a 51 y.o. male   Follow-up and Post-op of the Right Ankle (STATUS POST: Right ankle soft tissue mass excision DATE OF SURGERY: 04/17/17) and Suture / Staple Removal (Patient is here to follow-up on right ankle)  says he stopped wearing the boot on Monday.  He said he's been showering and keeping it covered and dry.  Complains of no pain.  Says started itching last day or 2.    History of Present Illness    Review of Systems   Constitutional: Negative for diaphoresis, fever and unexpected weight change.   HENT: Negative for dental problem and sore throat.    Eyes: Negative for visual disturbance.   Respiratory: Negative for shortness of breath.    Cardiovascular: Negative for chest pain.   Gastrointestinal: Negative for abdominal pain, constipation, diarrhea, nausea and vomiting.   Genitourinary: Negative for difficulty urinating and frequency.   Neurological: Negative for headaches.   Hematological: Does not bruise/bleed easily.   All other systems reviewed and are negative.      Past Medical History:   Diagnosis Date   • History of MRI     MRI of back and was told it was arthritis in back        Past Surgical History:   Procedure Laterality Date   • APPENDECTOMY     • EXCISION MASS LEG Right 4/17/2017    Procedure: Right ankle soft tissue mass excision;  Surgeon: Nirav Aguayo DPM;  Location: [facility];  Service:    • HERNIA REPAIR         Allergies   Allergen Reactions   • No Known Drug Allergy        Objective   Physical Exam  Ortho Exam  Ortho Exam  Incisions are clean dry and intact, well coapted, no sign of dehiscence  Sutures are intact and no sign of infection is present      Assessment/Plan  status post 10 days from right ankle soft tissue mass excision  Independent Review of Radiographic Studies:    I reviewed his pathology report with him.  This was a benign angioleiomyoma.  Laboratory and Other Studies:     Medical Decision Making:        Procedures  [] No  procedures were performed in office today.    Daryl was seen today for suture / staple removal, follow-up and post-op.    Diagnoses and all orders for this visit:    Soft tissue mass        Recommendations/Plan:  I printed his path report and gave it to him.  I discussed that he may want to compare this pathology report others from these other masses he's had removed.  We removed his stitches and place Steri-Strips.  He cannot wear regular shoes as tolerated.  Rice as needed.  He can joana and showers normal.  I'll see him back in 2 or 3 weeks for one final checkup.  Call me with any questions.    Return in about 3 weeks (around 5/18/2017).  Patient agreeable to call or return sooner for any concerns.
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
## 2017-07-06 NOTE — TELEPHONE ENCOUNTER
Patient said he's been having bad chest congestion for a week or so. He said his mouth his dry but he's coughing up mucus. He also had body aches. He's been taking over the counter medicine like Robitussin and nothing is helping.    He wanted to know if Dr. LUNDY would be willing to send in anything to the pharmacy for him.    He'd like a callback.

## 2017-07-06 NOTE — TELEPHONE ENCOUNTER
I always recommend that patient be seen for illness and I do not want to call in any antibiotics in case this is viral.  Patient needs to be seen by me or someone else in the office tomorrow.

## 2017-07-25 PROBLEM — J00 ACUTE NASOPHARYNGITIS: Status: RESOLVED | Noted: 2017-02-24 | Resolved: 2017-01-01

## 2017-07-25 PROBLEM — M25.551 ACUTE RIGHT HIP PAIN: Status: ACTIVE | Noted: 2017-01-01

## 2017-07-25 NOTE — TELEPHONE ENCOUNTER
Pharmacy called about Prednisone.   Instructions are: 3 tabs q d for 1 day, then 2 tabs q d for 2 days, then 3 tabs q d for 3 days, then 1/2 q d for 4 days      Did you mean 1 tablet everyday for 3 days? Instead of 3 tabs for 3 days?

## 2017-07-25 NOTE — PROGRESS NOTES
Chief Complaint   Patient presents with   • Abstract     Pt states he fell going up stairs and hurt his right side.      Subjective   Daryl Jerome is a 51 y.o. male.     HPI Comments: Pt states he fell going up the stairs.  He fell up the steps while running with his dog.   Right hip hurts.  He has numbness in right leg.   He has tried ice to area.  No pain in back.   Also has pain in right buttock.   He is taking celebrex and IBU for pain.   Pain is unchanged from when incident occurred a few days ago.          The following portions of the patient's history were reviewed and updated as appropriate: allergies, current medications, past family history, past medical history, past social history, past surgical history and problem list.    Review of Systems   Musculoskeletal: Positive for arthralgias (right hip pain) and back pain.   Neurological: Positive for numbness.       Objective   /86  Pulse 66  Temp 97.8 °F (36.6 °C)  SpO2 98%  There is no height or weight on file to calculate BMI.  Physical Exam   Constitutional: He is oriented to person, place, and time. He appears well-developed and well-nourished.   Patient is ambulating with the use of crutches   HENT:   Head: Normocephalic and atraumatic.   Musculoskeletal:   No vertebral point tenderness, pain over right sacroiliac and into right buttock, pain over right lateral hip with palpation, negative straight leg raise bilaterally, pain with right hip inversion and eversion into right lateral hip area, negative foot drop, muscle strength 4/5 in right lower extremity with hip flexion-all other muscle groups 5/5 in right lower extremity, sensation decreased to light touch in entire right lower extremity medially and laterally   Neurological: He is alert and oriented to person, place, and time. He displays abnormal reflex.   Patellar reflex +1 in right lower extremity and +3 and left lower extremity   Psychiatric: Judgment normal.   Flat affect   Nursing  note and vitals reviewed.      Assessment/Plan   Daryl Jerome is here today and the following problems have been addressed:      Daryl was seen today for abstract.    Diagnoses and all orders for this visit:    Acute right hip pain  -     XR Hip With or Without Pelvis 2 - 3 View Right; Future  -     XR Spine Lumbar 2 or 3 View; Future    Degenerative disc disease, lumbar  -     XR Spine Lumbar 2 or 3 View; Future    Other orders  -     predniSONE (DELTASONE) 20 MG tablet; Take 1 tablet by mouth Daily. 3 tabs q d for 1 day, then 2 tabs q d for 2 days, then 3 tabs q d for 3 days, then 1/2 q d for 4 days    XR right hip and lumbar spine  Given prednisone taper  Continue  mg TID  Will call pt with results  Will likely need MRI, as has only +1 DTR patellar on right and +3 on left    RTC 2 weeks  Please note that portions of this note were completed with a voice recognition program.  Efforts were made to edit dictation, but occasionally words are mistranscribed.

## 2017-07-28 NOTE — TELEPHONE ENCOUNTER
----- Message from Marilyn K Vermeesch, MD sent at 7/28/2017  3:45 PM EDT -----  Please tell patient that x-ray of hip is unremarkable.  X-ray of back reveals degenerative disc disease of thoracic and lumbar spine but no herniated disks.  The back x-ray was compared to previous and was unchanged per radiologist.  Please tell jennifer  ent he has been referred to a neurosurgeon for further evaluation and treatment

## 2017-07-28 NOTE — TELEPHONE ENCOUNTER
Patient is calling in regards to his X-Ray results.    He would like a callback when the results are in.

## 2017-08-08 NOTE — ED PROVIDER NOTES
Subjective   HPI Comments: Patient is here with exacerbation of some chronic back pain patient states he fell about 3 weeks ago on some steps he has seen his PCP since it did have x-rays he does have appointment coming up to see Dr. Jose Padron apparently tomorrow.  He denies bowel or bladder dysfunction doesn't to some paresthesias right buttocks radicular pain into the right leg no chest pain no abdominal pain no nausea no vomiting no fevers no other systemic complaints,.. Patient does endorse history of chronic back pain and had been in pain management but no longer in pain management,.  Denies history of IV drug use... Patient states he has had injections in his back in the past as well      Review of Systems   Constitutional: Negative.  Negative for chills and fever.   HENT: Negative.    Eyes: Negative.    Respiratory: Negative.    Cardiovascular: Negative.    Gastrointestinal: Negative.  Negative for abdominal pain.   Genitourinary: Negative.  Negative for dysuria, frequency and testicular pain.   Skin: Negative.  Negative for rash.   Neurological: Negative for weakness and numbness.        Paresthesias right lower extremity,.. No saddle anesthesia or bowel or bladder dysfunction   Psychiatric/Behavioral: The patient is nervous/anxious.    All other systems reviewed and are negative.      Past Medical History:   Diagnosis Date   • History of MRI     MRI of back and was told it was arthritis in back   • Injury of back        Allergies   Allergen Reactions   • No Known Drug Allergy        Past Surgical History:   Procedure Laterality Date   • APPENDECTOMY     • EXCISION MASS LEG Right 4/17/2017    Procedure: Right ankle soft tissue mass excision;  Surgeon: Nirav Aguayo DPM;  Location: [facility];  Service:    • HERNIA REPAIR         Family History   Problem Relation Age of Onset   • Family history unknown: Yes       Social History     Social History   • Marital status: Single     Spouse name: N/A   • Number of  children: N/A   • Years of education: N/A     Social History Main Topics   • Smoking status: Former Smoker   • Smokeless tobacco: Never Used   • Alcohol use Yes      Comment: social   • Drug use: Yes      Comment: no needles ,but i've tried about any drug   • Sexual activity: Defer     Other Topics Concern   • None     Social History Narrative   • None           Objective   Physical Exam   Constitutional: He is oriented to person, place, and time. He appears well-developed and well-nourished.   Afebrile nontoxic,.. Mild discomfort   HENT:   Head: Normocephalic.   Eyes: EOM are normal. Pupils are equal, round, and reactive to light.   Neck: Normal range of motion. Neck supple.   Cardiovascular: Regular rhythm and intact distal pulses.    Pulmonary/Chest: Effort normal and breath sounds normal.   Abdominal: Soft. Bowel sounds are normal. There is no tenderness.   Musculoskeletal: Normal range of motion.   Increased spasm right paralumbar L5-S1 right SI joint tenderness to the right buttocks   Neurological: He is alert and oriented to person, place, and time. He has normal reflexes. No cranial nerve deficit.   1+ patellar DTRs right and left   Skin: Skin is warm and dry. No rash noted.   Psychiatric: He has a normal mood and affect. His behavior is normal. Judgment and thought content normal.   Nursing note and vitals reviewed.      Procedures         ED Course  ED Course   Comment By Time   Patient feeling some better has been ambulatory in the exam room pending CT report Bobby Pretty PA-C 08/08 1236   Pt CT report with Dr Domingo... Patient does have follow-up with Dr. Jose Padron will have him keep this appointment return here if he experienced worsening pain bowel or bladder dysfunction fevers or any worsening symptoms otherwise Bobby Pretty PA-C 08/08 1304                  MDM    Final diagnoses:   Acute exacerbation of chronic low back pain   Degenerative disc disease, lumbar            Bobby KENNY  MICHELLE Pretty  08/08/17 0321       Bboby Pretty PA-C  08/08/17 3724

## 2017-08-08 NOTE — ED NOTES
Pt sitting in chair at bedside.  States he cannot get comfortable.     Sushma Mora RN  08/08/17 0979

## 2017-09-12 NOTE — TELEPHONE ENCOUNTER
[facility] called today regarding patient's request for referral. She states that he told her that he no longer wanted to be seen at [facility], he would like to switch. It is due to his shoulder. He'd be seeing Dr. Chivo Hanks. Fax number is [phone].

## 2017-09-19 NOTE — TELEPHONE ENCOUNTER
Patient called today requesting to have a referral for Ortho, with Dr. Luz ordered. He'd like to see him about his back issues, if possible. He'd like a callback.

## 2017-10-03 NOTE — PROGRESS NOTES
Subjective   Patient ID: Daryl Jerome is a 52 y.o. male  Pain and Consult of the Right Shoulder (Patient is here today to be seen at the request of Dr. Vermeesch. Patient has been experiencing pain for quite sometime. Patient is right hand dominant. Patient states has two tears. 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  MRI done August of this year showed intrasubstance tear of the distal supraspinatus tendon with corresponding impingement.  Patient has completed physical therapy in the past.  Denies current neck pain paresthesias, has occasional pain at the left shoulder with overhead reaching.      Review of Systems   Constitutional: Negative for diaphoresis, fever and unexpected weight change.   HENT: Negative for dental problem and sore throat.    Eyes: Negative for visual disturbance.   Respiratory: Negative for shortness of breath.    Cardiovascular: Negative for chest pain.   Gastrointestinal: Negative for abdominal pain, constipation, diarrhea, nausea and vomiting.   Genitourinary: Negative for difficulty urinating and frequency.   Musculoskeletal: Positive for arthralgias.   Neurological: Negative for headaches.   Hematological: Does not bruise/bleed easily.       Past Medical History:   Diagnosis Date   • History of MRI     MRI of back and was told it was arthritis in back   • Injury of back         Past Surgical History:   Procedure Laterality Date   • APPENDECTOMY     • EXCISION MASS LEG Right 4/17/2017    Procedure: Right ankle soft tissue mass excision;  Surgeon: Nirav Aguayo DPM;  Location: [facility];  Service:    • HERNIA REPAIR    "      Family History   Problem Relation Age of Onset   • No Known Problems Mother    • No Known Problems Father        Social History     Social History   • Marital status: Single     Spouse name: N/A   • Number of children: N/A   • Years of education: N/A     Occupational History   • Not on file.     Social History Main Topics   • Smoking status: Former Smoker   • Smokeless tobacco: Never Used   • Alcohol use Yes      Comment: social   • Drug use: Yes      Comment: no needles ,but i've tried about any drug   • Sexual activity: Defer     Other Topics Concern   • Not on file     Social History Narrative       All the above social hx, family hx, surgical history,medications, allergies, ros & HPI reviewed.    Allergies   Allergen Reactions   • No Known Drug Allergy        Objective   Resp 16  Ht 66\" (167.6 cm)  Wt 181 lb 1.6 oz (82.1 kg)  BMI 29.23 kg/m2   Physical Exam  Constitutional: He is oriented to person, place, and time. He appears well-developed and well-nourished.   HENT:   Head: Normocephalic and atraumatic.   Eyes: EOM are normal. Pupils are equal, round, and reactive to light.   Neck: Normal range of motion. Neck supple.   Cardiovascular: Normal rate.    Pulmonary/Chest: Effort normal and breath sounds normal.   Abdominal: Soft.   Neurological: He is alert and oriented to person, place, and time.   Skin: Skin is warm and dry.   Psychiatric: He has a normal mood and affect.   Nursing note and vitals reviewed.       Ortho Exam   Right shoulder with no overt atrophy in the supraspinatus or tenderness at the acromioclavicular joint, minimal tenderness anterior biceps tendon, forward elevation 140 with pain impingement sign positive negative inferior sulcus sign negative subscap lift off test positive anterolateral shoulder pain with supraspinatus resistance, no sign of infra-spinatus tenderness or weakness.    Assessment/Plan   Review of Radiographic Studies:    Radiographic images today of affected area I " personally viewed and showed no sign of acute fracture or dislocation.      Procedures     Daryl was seen today for pain and consult.    Diagnoses and all orders for this visit:    Bursitis/tendonitis, shoulder    Complete tear of right rotator cuff     Orthopedic activities reviewed and patient expressed appreciation and Risk, benefits, and merits of treatment alternatives reviewed with the patient and questions answered      Recommendations/Plan:   Referral: PT/OT 2-3 x wk x 4-6 wks    Patient agreeable to call or return sooner for any concerns.       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      Impression:  Right shoulder pain impingement small non-retracted intrasubstance supraspinatus tear confirmed on recent MRI   Plan:  Refer to therapy, recheck 1 month if not improved discuss arthroscopic treatment

## 2017-10-06 NOTE — TELEPHONE ENCOUNTER
Patient called stating still experiencing pain in shoulder. Patient stated that pain medication does not help; requesting if a mild muscle relaxer could be prescribed to use during physical therapy. Patient begins PT 10/11/17.

## 2017-10-10 NOTE — TELEPHONE ENCOUNTER
Called patient and informed him that Dr. Hanks does not want to prescribe muscle relaxer but should still do therapy.

## 2017-10-11 PROBLEM — Z12.11 COLON CANCER SCREENING: Status: ACTIVE | Noted: 2017-01-01

## 2017-10-11 NOTE — PROGRESS NOTES
Chief Complaint   Patient presents with   • Colon Cancer Screening     HPI     The patient denies recent change in bowel habits. There is no diarrhea or constipation. There is no history of abdominal pain. There is no history of overt GI bleed (hematemesis melena or hematochezia). The patient denies nausea or vomiting. There is no history of reflux. The patient denies dysphagia or odynophagia. There is no history of recent significant weight loss. There is no history of liver disease in the past. There is no family history of colon cancer. The patient has not had a colonoscopy in the past.    Review of Systems   Constitutional: Positive for fatigue. Negative for appetite change, chills, fever and unexpected weight change.   HENT: Negative for mouth sores, nosebleeds and trouble swallowing.    Eyes: Negative for discharge and redness.   Respiratory: Negative for apnea, cough and shortness of breath.    Cardiovascular: Negative for chest pain, palpitations and leg swelling.   Gastrointestinal: Negative for abdominal distention, abdominal pain, anal bleeding, blood in stool, constipation, diarrhea, nausea and vomiting.   Endocrine: Negative for cold intolerance, heat intolerance and polydipsia.   Genitourinary: Negative for dysuria, hematuria and urgency.   Musculoskeletal: Positive for arthralgias, back pain and joint swelling. Negative for myalgias.   Skin: Negative for rash.   Allergic/Immunologic: Negative for food allergies and immunocompromised state.   Neurological: Negative for dizziness, seizures, syncope and headaches.   Hematological: Negative for adenopathy. Does not bruise/bleed easily.   Psychiatric/Behavioral: Negative for dysphoric mood. The patient is nervous/anxious. The patient is not hyperactive.      Patient Active Problem List   Diagnosis   • Pain in shoulder   • Lumbar spine pain   • Degenerative disc disease, lumbar   • Cyst of tendon sheath   • Acute right hip pain   • Colon cancer screening  "    Past Medical History:   Diagnosis Date   • History of MRI     MRI of back and was told it was arthritis in back   • Injury of back      Past Surgical History:   Procedure Laterality Date   • APPENDECTOMY     • EXCISION MASS LEG Right 4/17/2017    Procedure: Right ankle soft tissue mass excision;  Surgeon: Nirav Aguayo DPM;  Location: [facility];  Service:    • HERNIA REPAIR       Family History   Problem Relation Age of Onset   • No Known Problems Mother    • No Known Problems Father    • Colon cancer Neg Hx      Social History   Substance Use Topics   • Smoking status: Former Smoker   • Smokeless tobacco: Never Used   • Alcohol use Yes      Comment: social       Current Outpatient Prescriptions:   •  celecoxib (CeleBREX) 200 MG capsule, Take 1 capsule by mouth 2 (Two) Times a Day., Disp: 60 capsule, Rfl: 5  •  fluticasone (FLONASE) 50 MCG/ACT nasal spray, 2 sprays into each nostril Daily., Disp: 1 each, Rfl: 3  •  ibuprofen (ADVIL,MOTRIN) 200 MG tablet, Take 200 mg by mouth Every 6 (Six) Hours As Needed for Mild Pain (1-3)., Disp: , Rfl:     Allergies   Allergen Reactions   • No Known Drug Allergy      /93  Pulse 100  Temp 98.3 °F (36.8 °C)  Resp 15  Ht 66\" (167.6 cm)  Wt 180 lb (81.6 kg)  BMI 29.05 kg/m2    Physical Exam   Constitutional: He is oriented to person, place, and time. He appears well-developed and well-nourished. No distress.   HENT:   Head: Normocephalic and atraumatic.   Right Ear: Hearing and external ear normal.   Left Ear: Hearing and external ear normal.   Nose: Nose normal.   Mouth/Throat: Oropharynx is clear and moist and mucous membranes are normal. Mucous membranes are not pale, not dry and not cyanotic. No oral lesions. No oropharyngeal exudate.   Eyes: Conjunctivae and EOM are normal. Right eye exhibits no discharge. Left eye exhibits no discharge.   Neck: Trachea normal. Neck supple. No JVD present. No edema present. No thyroid mass and no thyromegaly present. "   Cardiovascular: Normal rate, regular rhythm, S2 normal and normal heart sounds.  Exam reveals no gallop, no S3 and no friction rub.    No murmur heard.  Pulmonary/Chest: Effort normal and breath sounds normal. No respiratory distress. He exhibits no tenderness.   Abdominal: Normal appearance and bowel sounds are normal. He exhibits no distension, no ascites and no mass. There is no splenomegaly or hepatomegaly. There is no tenderness. There is no rigidity, no rebound and no guarding. No hernia.       Vascular Status -  His exam exhibits no right foot edema. His exam exhibits no left foot edema.  Lymphadenopathy:     He has no cervical adenopathy.        Left: No supraclavicular adenopathy present.   Neurological: He is alert and oriented to person, place, and time. He has normal strength. No cranial nerve deficit or sensory deficit.   Skin: No rash noted. He is not diaphoretic. No cyanosis. No pallor. Nails show no clubbing.   Psychiatric: He has a normal mood and affect.   Nursing note and vitals reviewed.  Stigmata of chronic liver disease:  None.  Asterixis:  None.    Laboratory Results:  Upon review of records:    Dated 4/11/2017 with [facility] 99 BUN 32 creatinine 1.0 sodium 140 potassium 4.3 chloride 105 CO2 27 albumin 4.9 ALT 31 AST 24 alkaline phosphatase phosphatase 56 total bilirubin 1.1 WBC 6.09 hemoglobin 15.5 hematocrit 45.5 platelet count 275 MCV 95.8    Assessment and Plan:    Daryl was seen today for colon cancer screening.    Diagnoses and all orders for this visit:    Colon cancer screening  Comments:  No prior colonoscopy. No family history of colon cancer.        Plan  and Patient Instructions:  Patient Instructions   1. Colonoscopy: Description of the procedure, risks, benefits, alternatives and options, including nonoperative options, were discussed with the patient in detail. The patient understands and wishes to proceed.    CORINNE Galvez

## 2017-11-15 NOTE — TELEPHONE ENCOUNTER
Called patient to inform him that [facility] does not accept his insurance. Referred him to [facility] in [place] with Dr. Padron, no referral needed.

## 2018-01-01 ENCOUNTER — ANESTHESIA EVENT (OUTPATIENT)
Dept: GASTROENTEROLOGY | Facility: HOSPITAL | Age: 53
End: 2018-01-01

## 2018-01-01 ENCOUNTER — HOSPITAL ENCOUNTER (OUTPATIENT)
Dept: MRI IMAGING | Facility: HOSPITAL | Age: 53
Discharge: HOME | End: 2018-01-19
Attending: ORTHOPAEDIC SURGERY | Admitting: ORTHOPAEDIC SURGERY

## 2018-01-01 ENCOUNTER — HOSPITAL ENCOUNTER (EMERGENCY)
Facility: HOSPITAL | Age: 53
End: 2018-04-15
Attending: EMERGENCY MEDICINE | Admitting: EMERGENCY MEDICINE

## 2018-01-01 ENCOUNTER — OFFICE VISIT (OUTPATIENT)
Dept: NEUROSURGERY | Facility: CLINIC | Age: 53
End: 2018-01-01

## 2018-01-01 ENCOUNTER — HOSPITAL ENCOUNTER (OUTPATIENT)
Facility: HOSPITAL | Age: 53
Setting detail: HOSPITAL OUTPATIENT SURGERY
Discharge: HOME | End: 2018-01-18
Attending: INTERNAL MEDICINE | Admitting: INTERNAL MEDICINE

## 2018-01-01 ENCOUNTER — ANESTHESIA (OUTPATIENT)
Dept: GASTROENTEROLOGY | Facility: HOSPITAL | Age: 53
End: 2018-01-01

## 2018-01-01 ENCOUNTER — TELEPHONE (OUTPATIENT)
Dept: ORTHOPEDIC SURGERY | Facility: CLINIC | Age: 53
End: 2018-01-01

## 2018-01-01 ENCOUNTER — APPOINTMENT (OUTPATIENT)
Dept: MRI IMAGING | Facility: HOSPITAL | Age: 53
End: 2018-01-01
Attending: ORTHOPAEDIC SURGERY

## 2018-01-01 VITALS
BODY MASS INDEX: 27.32 KG/M2 | HEIGHT: 66 IN | TEMPERATURE: 97.3 F | DIASTOLIC BLOOD PRESSURE: 78 MMHG | WEIGHT: 170 LBS | RESPIRATION RATE: 18 BRPM | OXYGEN SATURATION: 98 % | SYSTOLIC BLOOD PRESSURE: 120 MMHG | HEART RATE: 70 BPM

## 2018-01-01 DIAGNOSIS — M51.36 DEGENERATIVE DISC DISEASE, LUMBAR: Primary | ICD-10-CM

## 2018-01-01 DIAGNOSIS — Z12.11 COLON CANCER SCREENING: ICD-10-CM

## 2018-01-01 DIAGNOSIS — I46.9 CARDIOPULMONARY ARREST (HCC): Primary | ICD-10-CM

## 2018-01-01 DIAGNOSIS — M51.26 HERNIATION OF INTERVERTEBRAL DISC OF LUMBAR SPINE: ICD-10-CM

## 2018-01-01 LAB
GLUCOSE BLDC GLUCOMTR-MCNC: 207 MG/DL (ref 70–130)
LAB AP CASE REPORT: NORMAL
Lab: NORMAL
PATH REPORT.FINAL DX SPEC: NORMAL

## 2018-01-01 PROCEDURE — 25010000002 PROPOFOL 200 MG/20ML EMULSION: Performed by: NURSE ANESTHETIST, CERTIFIED REGISTERED

## 2018-01-01 PROCEDURE — 94799 UNLISTED PULMONARY SVC/PX: CPT

## 2018-01-01 PROCEDURE — 99284 EMERGENCY DEPT VISIT MOD MDM: CPT

## 2018-01-01 PROCEDURE — 82962 GLUCOSE BLOOD TEST: CPT

## 2018-01-01 PROCEDURE — 72148 MRI LUMBAR SPINE W/O DYE: CPT

## 2018-01-01 PROCEDURE — 92950 HEART/LUNG RESUSCITATION CPR: CPT

## 2018-01-01 PROCEDURE — 45380 COLONOSCOPY AND BIOPSY: CPT | Performed by: INTERNAL MEDICINE

## 2018-01-01 PROCEDURE — 25010000002 EPINEPHRINE PF 1 MG/10ML SOLUTION PREFILLED SYRINGE: Performed by: EMERGENCY MEDICINE

## 2018-01-01 PROCEDURE — S0260 H&P FOR SURGERY: HCPCS | Performed by: INTERNAL MEDICINE

## 2018-01-01 PROCEDURE — 99243 OFF/OP CNSLTJ NEW/EST LOW 30: CPT | Performed by: NEUROLOGICAL SURGERY

## 2018-01-01 RX ORDER — SODIUM CHLORIDE 9 MG/ML
70 INJECTION, SOLUTION INTRAVENOUS CONTINUOUS PRN
Status: DISCONTINUED | OUTPATIENT
Start: 2018-01-01 | End: 2018-01-01 | Stop reason: HOSPADM

## 2018-01-01 RX ORDER — PROPOFOL 10 MG/ML
INJECTION, EMULSION INTRAVENOUS AS NEEDED
Status: DISCONTINUED | OUTPATIENT
Start: 2018-01-01 | End: 2018-01-01 | Stop reason: SURG

## 2018-01-01 RX ADMIN — SODIUM CHLORIDE 70 ML/HR: 9 INJECTION, SOLUTION INTRAVENOUS at 08:05

## 2018-01-01 RX ADMIN — PROPOFOL 50 MG: 10 INJECTION, EMULSION INTRAVENOUS at 08:47

## 2018-01-01 RX ADMIN — PROPOFOL 50 MG: 10 INJECTION, EMULSION INTRAVENOUS at 08:35

## 2018-01-01 RX ADMIN — PROPOFOL 50 MG: 10 INJECTION, EMULSION INTRAVENOUS at 08:54

## 2018-01-01 RX ADMIN — PROPOFOL 50 MG: 10 INJECTION, EMULSION INTRAVENOUS at 09:02

## 2018-01-01 RX ADMIN — EPINEPHRINE 1 MG: 0.1 INJECTION, SOLUTION ENDOTRACHEAL; INTRACARDIAC; INTRAVENOUS at 13:10

## 2018-01-01 RX ADMIN — PROPOFOL 50 MG: 10 INJECTION, EMULSION INTRAVENOUS at 08:40

## 2018-01-18 NOTE — PLAN OF CARE
Problem: GI Endoscopy (Adult)  Goal: Signs and Symptoms of Listed Potential Problems Will be Absent or Manageable (GI Endoscopy)  Outcome: Ongoing (interventions implemented as appropriate)   01/18/18 0805   GI Endoscopy   Problems Assessed (GI Endoscopy) all   Problems Present (GI Endoscopy) none

## 2018-01-18 NOTE — ANESTHESIA PREPROCEDURE EVALUATION
Anesthesia Evaluation     Patient summary reviewed and Nursing notes reviewed          Airway   Mallampati: II  TM distance: >3 FB  Neck ROM: full  no difficulty expected  Dental      Pulmonary    Cardiovascular   Exercise tolerance: good (4-7 METS)    Rhythm: regular  Rate: normal        Neuro/Psych  GI/Hepatic/Renal/Endo      Musculoskeletal     (+) back pain,   Abdominal    Substance History      OB/GYN          Other   (+) arthritis                                             Anesthesia Plan    ASA 2     MAC     intravenous induction   Anesthetic plan and risks discussed with patient.    Plan discussed with CRNA.

## 2018-01-18 NOTE — DISCHARGE INSTRUCTIONS
To assist you in voiding:  Drink plenty of fluids  Listen to running water while attempting to void.    If you are unable to urinate and you have an uncomfortable urge to void or it has been   6 hours since you were discharged, return to the Emergency Room.    Rest today  No pushing,pulling,tugging,heavy lifting, or strenuous activity   No major decision making,driving,or drinking alcoholic beverages for 24 hours due to the sedation you received  Always use good hand hygiene/washing technique  No driving on pain medication.      Postprocedure instructions:    Nothing by mouth to fully alert.  Once fully alert may have clear liquid diet.  Advance diet as tolerated.  Vital signs as routine.    Diet:     Low fat diet.    Other Instructions:    Call [facility] at [phone] or come to the Emergency Department if you experience the following: Chest pain, abdominal pain, bleeding (vomiting of blood or coffee colored material, black stools or nanci blood in stools), fever/chills, nausea and vomiting or dizziness.      Follow-up:  DR. TORIBIO YUNG in 4 weeks.Office phone # [phone].    Follow-up colonoscopy: in 5 years.

## 2018-01-18 NOTE — OP NOTE
PROCEDURE:  Colonoscopy to the terminal ileum with 10 cold biopsy polypectomies and biopsies.      DATE OF PROCEDURE:  January 18, 2018    REFERRING PROVIDER:  Marlilyn Kay Vermeesch, M.D.     INSTRUMENT USED:  Olympus PCF H180 AL videocolonoscope.      INDICATIONS OF THE PROCEDURE:  This is a 52-year-old male for colon cancer screening.      BIOPSIES:  10 cold  biopsy polypectomies were performed in the rectum and sigmoid colon. Biopsies were obtained from the terminal ileum.      PHOTOGRAPHS:       MEDICATIONS:  MAC.       CONSENT/PREPROCEDURE EVALUATION:  Risks, benefits, alternatives and options of the procedure including risks of sedation/anesthesia were discussed with the patient and informed consent was obtained prior to the procedure.  History and physical examination were performed and nothing precluded the test.      REPORT:  The patient was placed in left lateral decubitus position and a digital examination was performed.  Once under the influence of IV sedation, the instrument was inserted into the rectum and advanced under direct vision to cecum which was identified by the ileocecal valve, triradiate folds and appendiceal orifice. The scope was then maneuvered into the terminal ileum.        FINDINGS:      Digital rectal examination:  Good anal tone.  No perianal pathology.  No mass.        Terminal ileum:  7-8 cm.  Erosions and focal areas of erythema were seen. Biopsies were obtained. Scant diverticular change was seen in the right colon.      Cecum and ascending colon: Normal.       Hepatic flexure, transverse colon, splenic flexure:           Descending colon, sigmoid colon and rectum:  scant left-sided diverticulosis. 10 small 2-3 mm sessile polyps were noted in the sigmoid colon and rectum. These were removed with cold biopsy forceps.  A retroflex examination within the rectum revealed internal hemorrhoids.        The scope was then straightened, the lower GI tract was decompressed, and the  scope was pulled out of the patient.  The patient tolerated the procedure well.  There were no immediate complications and the patient was transferred in stable condition for post procedure observation.      TECHNICAL DATA:   1. Seco prep score: 8 (3+2+3).    2. Anus to cecal time: 3  minutes.  3. Difficulty of examination:  Average.    4. Withdrawal time: 10 minutes.  5. Procedure time: 17 minutes  6. Retroflex examination in right colon: Yes.    7. Second look Rectum to cecum with decompression: Yes.    DIAGNOSES:    1. Scant left-sided diverticulosis with an occasional diverticulum in the right colon.  2. Small sessile colon polyps.  3. Internal hemorrhoids.  4. Erythema and erosions within the terminal ileum-ileitis. Changes seen within the terminal ileum hematocrit times associated with use of NSAIDs.    RECOMMENDATIONS:     Dietary instructions.  Follow biopsies.    Follow-up:    3-4 weeks.    Followup colonoscopy:  5 years.          Thank you very much for letting me participate in the care of this patient. Please do not hesitate to call me if you have any questions.

## 2018-01-18 NOTE — H&P
Chief complaint:  Colon Cancer Screen    History of present illness:     There is no history of: Abdominal Pain, Nausea, Vomiting, Reflux, Dysphagia, BH Change, Constipation, Diarrhea, Hematemesis, Melena, BRBPR, Pancreatic or Liver Disease.    Past medical history:   Past Medical History:   Diagnosis Date   • History of MRI     MRI of back and was told it was arthritis in back   • Injury of back        Surgical history:    Past Surgical History:   Procedure Laterality Date   • APPENDECTOMY     • EXCISION MASS LEG Right 4/17/2017    Procedure: Right ankle soft tissue mass excision;  Surgeon: Nirav Aguayo DPM;  Location: [facility] OR;  Service:    • HERNIA REPAIR         Social history:   ETOH: Yes   Tobacco Use:  No(Former)  Other Notes:    Allergies:  No known drug allergy    Latex allergy: None  Contrast allergy: None    Medications:  Prescriptions Prior to Admission   Medication Sig Dispense Refill Last Dose   • celecoxib (CeleBREX) 200 MG capsule Take 1 capsule by mouth 2 (Two) Times a Day. 60 capsule 5 Past Week at Unknown time   • fluticasone (FLONASE) 50 MCG/ACT nasal spray 2 sprays into each nostril Daily. 1 each 3 Past Week at Unknown time   • ibuprofen (ADVIL,MOTRIN) 200 MG tablet Take 200 mg by mouth Every 6 (Six) Hours As Needed for Mild Pain (1-3).   Past Week at Unknown time       Review of systems:   Constitutional: No recent:  Fever, Weight loss or Night sweats, no Glaucoma.  Respiratory: No recent: Hemoptysis, SOA, Cough, Sputum.    No Asthma, COPD or SELAM.  Cardiovascular: No Recent: Chest Pains, Orthopnea, PND, Palpitations or MI.     No history of: HTN, CAD, MI, CHF, VHD, RHD, PVD, or Arrhythmia.  Endocrine: No history of: DM, Hypothyroidism or Hyperthyroidism.  Genitourinary: No history of: Renal Failure, Kidney Stones, Recent UTI; No BPH.  Musculoskeletal: No history of: OA, RA, Gout, SLE or Fibromyalgia.  Neurological: No history of: Dementia, Migraines, RLS, Recent Seizures, CVA, TIA.   Hem.  "Oncology: No history of: Anemia or Known Cancer.  Psychiatric: No history of: Depression or Anxiety.     VITAL SIGNS:    Blood pressure 140/88, pulse 84, temperature 98.5 °F (36.9 °C), temperature source Temporal Artery , resp. rate 18, height 167.6 cm (66\"), weight 77.1 kg (170 lb), SpO2 97 %.    PHYSICAL EXAMINATION:   HEENT: Normal.   Lungs: Clear to auscultation.  Heart: No S3, no murmur.    Abdomen: Soft.  BS+ ND, NT  Extremities: No edema.  No cyanosis.  Neuro: Alert X 3. No focal deficit.    Assessment:  Colon Cancer Screen    Plan:   Colonoscopy    Risks/Benefits:  The potential benefits, risk and/or side effects of the procedure and alternatives have been discussed with the patient/authorized representative and questions were answered.     "

## 2018-02-13 NOTE — PROGRESS NOTES
Subjective   Patient ID: Daryl Jerome is a 52 y.o. male is being seen for consultation today at the request of Chivo Hanks MD  Chief Complaint: Lower back pain    History of Present Illness: The patient is a 52-year-old man with a history of chronic lower back pain.  He fell from a second-story in the early 90s, but has had more continuous pain since 2011 when he was bent over working building a deck.  He is bothered by house cleaning, car washing, and general work in construction and carpentry.  He has had injection therapy in his lower back which had no long-term benefit.  He has had chiropractic therapy which helps intermittently.  Pain is in the midline and varies in location from left to right but is not consistently on either unilateral side.  He is being treated for a right shoulder disorder as well.    Review of Radiographic Studies:  Lumbar MRI scan shows a foraminal disc herniation at L4-5 on the right, which does not correlate with a clinical L4 radiculopathy.  There is degenerative disc change at multiple levels, most marked at L4-5, less so at L3-4 and L2-3, but also noticed at T11-12 T12-L1.  There is no stenosis of the spinal canal and no evidence of nerve root compression within the spinal canal.    The following portions of the patient's history were reviewed, updated as appropriate and approved: allergies, current medications, past family history, past medical history, past social history, past surgical history, review of systems and problem list.  Review of Systems   Constitutional: Negative for activity change, appetite change, chills, diaphoresis, fatigue, fever and unexpected weight change.   HENT: Negative for congestion, dental problem, drooling, ear discharge, ear pain, facial swelling, hearing loss, mouth sores, nosebleeds, postnasal drip, rhinorrhea, sinus pressure, sneezing, sore throat, tinnitus, trouble swallowing and voice change.    Eyes: Negative for photophobia, pain,  discharge, redness, itching and visual disturbance.   Respiratory: Negative for apnea, cough, choking, chest tightness, shortness of breath, wheezing and stridor.    Cardiovascular: Negative for chest pain, palpitations and leg swelling.   Gastrointestinal: Negative for abdominal distention, abdominal pain, anal bleeding, blood in stool, constipation, diarrhea, nausea, rectal pain and vomiting.   Endocrine: Negative for cold intolerance, heat intolerance, polydipsia, polyphagia and polyuria.   Genitourinary: Negative for decreased urine volume, difficulty urinating, dysuria, enuresis, flank pain, frequency, genital sores, hematuria and urgency.   Musculoskeletal: Positive for back pain. Negative for arthralgias, gait problem, joint swelling, myalgias, neck pain and neck stiffness.   Skin: Negative for color change, pallor, rash and wound.   Allergic/Immunologic: Negative for environmental allergies, food allergies and immunocompromised state.   Neurological: Negative for dizziness, tremors, seizures, syncope, facial asymmetry, speech difficulty, weakness, light-headedness, numbness and headaches.   Hematological: Negative for adenopathy. Does not bruise/bleed easily.   Psychiatric/Behavioral: Negative for agitation, behavioral problems, confusion, decreased concentration, dysphoric mood, hallucinations, self-injury, sleep disturbance and suicidal ideas. The patient is not nervous/anxious and is not hyperactive.        Objective     NEUROLOGICAL EXAMINATION:      MENTAL STATUS:  Alert and oriented.  Speech intact.  Recent and remote memory intact.      CRANIAL NERVES:  Cranial nerves III, IV and VI:  PERRLADC.  Extraocular movements are intact.  Nystagmus is not present.  Cranial nerve V:  Facial sensation is intact  Cranial nerve VII:  Muscles of facial expression reveal no asymmetry.  Cranial nerve VIII:  Hearing is intact    MUSCULOSKELETAL:  SLR negative to 90°.  Layo's test negative bilaterally.    MOTOR:   Knee extension, ankle dorsiflexion, plantar flexion intact bilaterally.    SENSATION: Intact over the lower extremities without focal loss.    REFLEXES:  DTR trace in knees and ankles.    Assessment   Chronic back pain syndrome associated with multilevel degenerative lumbar disc disease.  Asymptomatic foraminal disc herniation L4-5 right, without evidence of right L4 radiculopathy.  No segmental instability or significant dural compression.       Plan   This back pain syndrome cannot be successfully treated with surgery.  He has tried all the reasonable available medical management methods that I am aware of, which include physical therapy, chiropractic therapy, and various injection therapies.  I am unaware of any other specific medical treatment to recommend at this time.  Another trial of lumbar injection to pain management is feasible, although his prior experience was unfavorable and the side do not recommended again. ]       Jose Nation MD

## 2018-04-15 NOTE — ED PROVIDER NOTES
Subjective   52-year-old male presenting with cardiac arrest.  Patient is unresponsive and intubated, cannot provide any history.  Per report patient went to the restroom, was in there for approximately 20 minutes, family check on him and he was found to be collapsed.  Patient has a known history of drug abuse.  EMS was called.  When they arrived CPR was ongoing.  Patient received 5 doses of epinephrine and 2 mg of Narcan in route, he remained in asystole during transport.  Upon arrival patient had been down for nearly an hour.            Review of Systems   Unable to perform ROS: Intubated       Past Medical History:   Diagnosis Date   • History of MRI     MRI of back and was told it was arthritis in back   • Injury of back        Allergies   Allergen Reactions   • No Known Drug Allergy        Past Surgical History:   Procedure Laterality Date   • APPENDECTOMY     • COLONOSCOPY N/A 1/18/2018    Procedure: COLONOSCOPY with cold biopsy polypectomies and biopsies;  Surgeon: Brendon Sarah MD;  Location: [facility] ENDOSCOPY;  Service:    • EXCISION MASS LEG Right 4/17/2017    Procedure: Right ankle soft tissue mass excision;  Surgeon: Nirav Aguayo DPM;  Location: [facility] OR;  Service:    • HERNIA REPAIR         Family History   Problem Relation Age of Onset   • No Known Problems Mother    • No Known Problems Father    • Colon cancer Neg Hx        Social History     Social History   • Marital status: Single     Social History Main Topics   • Smoking status: Former Smoker   • Smokeless tobacco: Never Used   • Alcohol use No   • Drug use: No   • Sexual activity: Defer     Other Topics Concern   • Not on file           Objective   Physical Exam   Constitutional:   Critically ill appearing   HENT:   Head: Normocephalic and atraumatic.   Right Ear: External ear normal.   Left Ear: External ear normal.   Nose: Nose normal.   Eyes: Conjunctivae are normal.   Pupils dilated and nonreactive   Neck: No tracheal deviation present.    Cardiovascular:   Pulseless, compressions being performed by Dariusz device   Pulmonary/Chest:   Intubated, breath sounds bilateral   Abdominal: Soft. Bowel sounds are normal. He exhibits no distension.   Musculoskeletal: He exhibits no edema or deformity.   Neurological:   GCS 3T   Skin:   Cool, cyanotic   Psychiatric:   Cannot assess   Nursing note and vitals reviewed.      Procedures         ED Course  ED Course                  MDM  Number of Diagnoses or Management Options  Cardiopulmonary arrest:   Diagnosis management comments: 52-year-old male with cardiopulmonary arrest.  Chronically ill-appearing man with exam as above.  On arrival ACLS was continued.  After another round of CPR patient remained in asystole, there is no cardiac activity on bedside ultrasound.  Also did not note large pericardial effusion or pneumothorax.  Given at this point patient has been down for over an hour decision made to cease resuscitation.  Time of death 1313.   notified.  We'll update family if and when they arrive.    DDX: Cardiopulmonary arrest      Final diagnoses:   Cardiopulmonary arrest            Leo Domingo MD  04/15/18 [id number]

## 2018-08-25 NOTE — PROGRESS NOTES
Chief Complaint   Patient presents with   • Abstract     Pt states he has a knot/cyst on his right ankle.      Subjective   Daryl Jerome is a 51 y.o. male.     HPI Comments: Patient is here with complaints of a knot or cyst on his right ankle.  He has had this knot on ankle for several yrs.  It is painful with pressure with shoe or palpation.  No pain with ambulation.    He has had a cough for a few days.  Has some mucous production this morning.  Has had a ST for a few days to.  No fevers or body aches.  Some PND.  Has taken some zyrtec and flonase.     He is seeing Dr Padron and has been getting epidurals.  Had been on celebrex also with some relieve of pain.         The following portions of the patient's history were reviewed and updated as appropriate: allergies, current medications, past family history, past medical history, past social history, past surgical history and problem list.    Review of Systems   Constitutional: Negative for chills and fever.   HENT: Positive for congestion, postnasal drip, rhinorrhea and sore throat.    Respiratory: Positive for cough. Negative for shortness of breath and wheezing.    Musculoskeletal:        Right lateral ankle cyst like lesion   All other systems reviewed and are negative.      Objective   Visit Vitals   • /68   • Pulse 61   • Temp 97.9 °F (36.6 °C)   • SpO2 98%     There is no height or weight on file to calculate BMI.  Physical Exam   Constitutional: He is oriented to person, place, and time. He appears well-developed and well-nourished.   HENT:   Head: Normocephalic and atraumatic.   Mouth/Throat: Oropharynx is clear and moist.   Tympanic membranes normal, turbinates red and inflamed with white rhinorrhea and white postnasal drip, no sinus tenderness   Eyes: Conjunctivae and EOM are normal. Pupils are equal, round, and reactive to light.   Neck: Normal range of motion. Neck supple. No thyromegaly present.   Cardiovascular: Normal rate, regular rhythm and  normal heart sounds.    No murmur heard.  Pulmonary/Chest: Effort normal and breath sounds normal. No respiratory distress.   Musculoskeletal:   Right lateral ankle with 1.5 cm mobile cystic mass over the lateral malleolus that is tender with palpation   Lymphadenopathy:     He has no cervical adenopathy.   Neurological: He is alert and oriented to person, place, and time. No cranial nerve deficit.   Psychiatric: He has a normal mood and affect. Judgment normal.   Nursing note and vitals reviewed.      Assessment/Plan   Daryl Jerome is here today and the following problems have been addressed:      Daryl was seen today for abstract.    Diagnoses and all orders for this visit:    Acute nasopharyngitis    Cyst of tendon sheath  -     Ambulatory Referral to Orthopedic Surgery      Recommend continue flonase and zyrtec  Take plain mucinex in AM also   Increase fluids and rest  Referred to ortho for right lateral ankle cyst removal      RTC prn   home

## (undated) DEVICE — BANDAGE,GAUZE,BULKEE II,4.5"X4.1YD,STRL: Brand: MEDLINE

## (undated) DEVICE — PK EXTRM LOWR 20

## (undated) DEVICE — FRCP BIOP COLD ENDOJAW ALLGTR W/NDL 2.8X2300MM BLU

## (undated) DEVICE — DRSNG WND GZ CURAD OIL EMULSION 3X3IN STRL

## (undated) DEVICE — SUT VIC 2/0 SH 27IN

## (undated) DEVICE — SUT ETHLN 3/0 PS2 18 IN 1669H

## (undated) DEVICE — TOWEL,OR,DSP,ST,BLUE,STD,4/PK,20PK/CS: Brand: MEDLINE

## (undated) DEVICE — SOL IRR NACL 0.9PCT 3000ML

## (undated) DEVICE — BNDG ELAS ELITE V/CLOSE 4IN 5YD LF STRL

## (undated) DEVICE — GLV SURG BIOGEL SENSR LTX PF SZ7.5

## (undated) DEVICE — CUFF SCD HEMOFORCE SEQ CALF STD MD

## (undated) DEVICE — GLV SURG SENSICARE W/ALOE PF LF 8 STRL

## (undated) DEVICE — SUT VIC 3/0 SH 27IN J416H

## (undated) DEVICE — ENDOGATOR AUXILIARY WATER JET CONNECTOR: Brand: ENDOGATOR

## (undated) DEVICE — SPNG GZ WOVN 4X4IN 12PLY 10/BX STRL

## (undated) DEVICE — SINGLE PORT MANIFOLD: Brand: NEPTUNE 2

## (undated) DEVICE — DISPOSABLE TOURNIQUET CUFF SINGLE BLADDER, SINGLE PORT AND LUER LOCK CONNECTOR: Brand: COLOR CUFF

## (undated) DEVICE — JELLY,LUBE,STERILE,FLIP TOP,TUBE,2-OZ: Brand: MEDLINE

## (undated) DEVICE — Device